# Patient Record
Sex: FEMALE | Race: WHITE
[De-identification: names, ages, dates, MRNs, and addresses within clinical notes are randomized per-mention and may not be internally consistent; named-entity substitution may affect disease eponyms.]

---

## 2018-05-07 ENCOUNTER — HOSPITAL ENCOUNTER (OUTPATIENT)
Dept: HOSPITAL 45 - C.LABBC | Age: 78
Discharge: HOME | End: 2018-05-07
Attending: INTERNAL MEDICINE
Payer: COMMERCIAL

## 2018-05-07 DIAGNOSIS — I48.0: Primary | ICD-10-CM

## 2018-05-17 ENCOUNTER — HOSPITAL ENCOUNTER (OUTPATIENT)
Dept: HOSPITAL 45 - C.ACU | Age: 78
Discharge: HOME | End: 2018-05-17
Attending: INTERNAL MEDICINE
Payer: COMMERCIAL

## 2018-05-17 VITALS
TEMPERATURE: 98.42 F | HEART RATE: 82 BPM | DIASTOLIC BLOOD PRESSURE: 75 MMHG | SYSTOLIC BLOOD PRESSURE: 123 MMHG | OXYGEN SATURATION: 97 %

## 2018-05-17 VITALS
OXYGEN SATURATION: 95 % | HEART RATE: 63 BPM | DIASTOLIC BLOOD PRESSURE: 76 MMHG | TEMPERATURE: 98.6 F | SYSTOLIC BLOOD PRESSURE: 134 MMHG

## 2018-05-17 VITALS
WEIGHT: 159.84 LBS | BODY MASS INDEX: 28.32 KG/M2 | WEIGHT: 159.84 LBS | HEIGHT: 62.99 IN | BODY MASS INDEX: 28.32 KG/M2 | HEIGHT: 62.99 IN

## 2018-05-17 VITALS
DIASTOLIC BLOOD PRESSURE: 83 MMHG | SYSTOLIC BLOOD PRESSURE: 117 MMHG | TEMPERATURE: 98.24 F | OXYGEN SATURATION: 95 % | HEART RATE: 75 BPM

## 2018-05-17 DIAGNOSIS — Z82.62: ICD-10-CM

## 2018-05-17 DIAGNOSIS — Z88.1: ICD-10-CM

## 2018-05-17 DIAGNOSIS — Z79.82: ICD-10-CM

## 2018-05-17 DIAGNOSIS — E03.9: ICD-10-CM

## 2018-05-17 DIAGNOSIS — I48.0: ICD-10-CM

## 2018-05-17 DIAGNOSIS — I10: ICD-10-CM

## 2018-05-17 DIAGNOSIS — Z79.899: ICD-10-CM

## 2018-05-17 DIAGNOSIS — E78.5: ICD-10-CM

## 2018-05-17 DIAGNOSIS — Z80.8: ICD-10-CM

## 2018-05-17 DIAGNOSIS — Z45.09: Primary | ICD-10-CM

## 2018-05-17 DIAGNOSIS — R00.2: ICD-10-CM

## 2018-05-17 DIAGNOSIS — Z82.79: ICD-10-CM

## 2018-05-17 NOTE — PRE SEDATION ASSESSMENT
Pre Sedation Assessment


General


Date of Sedation:


May 17, 2018.





Vital Signs Past 12 Hours








  Date Time  Temp Pulse Resp B/P (MAP) Pulse Ox O2 Delivery O2 Flow Rate FiO2


 


5/17/18 08:25 36.9 82 16 123/75 (91) 97 Room Air  











Review


Cardiovascular:  regular rate, rhythm, no edema, no gallop, no JVD, no murmur, 

normal peripheral pulses


Lungs:  chest non-tender, lungs clear, normal breath sounds, no respiratory 

distress, no accessory muscle use





Pre-Sedation Airway Assessment


Smoking Status:  Never Smoker


Hx of Sleep Apnea:  No


Hx of difficult intubation:  No


Short Thick Neck:  No


Thyro-mental Distance:  > 3 Finger Breadths


Oral Cavity:  Capped Teeth


Mallampati Classification:  Class II


ASA Classification:  Class II





NPO Status


Date of Last Intake of Fluids:  May 17, 2018


Time of Last Intake of Fluids:  0600


Date of Last Intake of Solids:  May 16, 2018


Time of Last Intake of Solids:  1900





Procedure Planning


Contraindications for Sedation:  None


Current Medications Reviewed:  Yes





Notes








The planned sedation has been discussed with the patient. Informed Consent was 

obtained.  I have identified the patient, determined the appropriateness of 

sedation and have assessed the patient immediately prior to the procedure.  





All medicine(s) and interventions are by my order.

## 2018-05-17 NOTE — HISTORY & PHYSICAL BRIDGE NOTE
H&P Re-Evaluation


Bridge Note:


I have examined the patient, reviewed the History & Physical and in the 

interval since the performance of the History & Physical I have noted the 

following changes of clinical significance: No changes noted.  I reviewed the 

indications, procedure, risks and alternatives of loop recorder explantation 

with her and she understands and agrees to proceed.  Consent obtained.  I 

reviewed the indications and risks of conscious sedation and she understands.  

Consent obtained.

## 2018-05-17 NOTE — [facility] OPERATIVE REPORT
Operative Report


Operative Date


May 17, 2018.





Pre-Operative Diagnosis





Loop recorder battery depletion





Post-Operative Diagnosis


Same





Procedure(s) Performed


Loop recorder removal





Surgeon


Dr. Rebolledo





Assistant Surgeon(s)


None





Estimated Blood Loss


2 cc





Findings


Uncomplicated explantation





Specimens





Loop recorder, return to [facility]





Anesthesia


Local without sedation





Complication(s)


None





Disposition


MTU





Description of Procedure


After obtaining informed consent for the procedure, the patient was brought to 

the laboratory having had nothing by mouth after midnight. The patient was 

prepped and draped in the standard sterile manner for a loop recorder removal.





The area was infiltrated with 1% lidocaine local anesthetic and a 1.5 cm 

incision was made through the old implant scar and carried down to the loop 

recorder. The loop recorder was dissected free of tissue and explanted. The 

incision was closed with a subcutaneous continuous closure of 4-0 Vicryl 

followed by running subcuticular skin closure of 4-0 Vicryl. Steri-Strips were 

applied and bacitracin ointment was placed on the incision. A dressing was 

applied.


I attest to the content of the Intraoperative Record and any orders documented 

therein.  Any exceptions are noted below.

## 2018-05-17 NOTE — DISCHARGE INSTRUCTIONS
Discharge Instructions


Date of Service


May 17, 2018.





Admission


Reason for Admission: Loop recorder at end of life





Discharge


Discharge Diagnosis / Problem:  Loop recorder explantation





Discharge Goals


Goal(s):  Therapeutic intervention





Activity Recommendations


Activity Limitations:  resume your previous activity





.





Instructions / Follow-Up


Instructions / Follow-Up


ACTIVITY RECOMMENDATIONS:





* Do not raise affected arm over head for 2 weeks.








SPECIAL CARE INSTRUCTIONS:





*  If bleeding occurs, apply direct pressure to area for 5 minutes.





*  Call your doctor if you have severe pain, fever, drainage or bleeding at 

site.





*  Keep dressing on and dry.





*  Keep any scheduled doctor's appointment.











FOLLOW UP VISIT:





Dr. Rebolledo Friday, May 18, 2018, 10:15 AM.





Current Hospital Diet


Patient's current hospital diet:





Discharge Diet


Recommended Diet:  AHA Diet (Heart Healthy)





Pending Studies


Studies pending at discharge:  no





Laboratory Results





Hemoglobin A1c








Test


  4/24/18


07:42 Range/Units


 


 


Estimated Average Glucose 120   mg/dl


 


Hemoglobin A1c 5.8 H 4.5-5.6  %








Lipid Panel








Test


  4/24/18


07:42 Range/Units


 


 


Triglycerides Level 90  0-150  mg/dl


 


Cholesterol Level 189  0-200  mg/dl


 


HDL Cholesterol 45   mg/dl


 


Cholesterol/HDL Ratio 4.2   


 


LDL Cholesterol, Calculated 126   mg/dl











Medical Emergencies








.


Who to Call and When:





Medical Emergencies:  If at any time you feel your situation is an emergency, 

please call 911 immediately.





.





Non-Emergent Contact


Non-Emergency issues call your:  Primary Care Provider





.


.








"Provider Documentation" section prepared by Israel Rebolledo.








.